# Patient Record
Sex: MALE | Race: WHITE | NOT HISPANIC OR LATINO | URBAN - METROPOLITAN AREA
[De-identification: names, ages, dates, MRNs, and addresses within clinical notes are randomized per-mention and may not be internally consistent; named-entity substitution may affect disease eponyms.]

---

## 2018-05-07 NOTE — PATIENT PROFILE ADULT. - PMH
Anxiety    Lord esophagus    COPD (chronic obstructive pulmonary disease)  mild (uses Cpap)  Depression    DM (diabetes mellitus)  2  Dyspnea    Essential hypertension    HLD (hyperlipidemia)    Hypercholesteremia    Obesity    NAGI (obstructive sleep apnea)    Sciatica

## 2018-05-07 NOTE — PATIENT PROFILE ADULT. - TEACHING/LEARNING LEARNING PREFERENCES
audio/individual instruction/verbal instruction/written material individual instruction/skill demonstration/verbal instruction/written material/audio

## 2018-05-08 ENCOUNTER — INPATIENT (INPATIENT)
Facility: HOSPITAL | Age: 73
LOS: 0 days | Discharge: HOME | DRG: 694 | End: 2018-05-09
Attending: UROLOGY | Admitting: UROLOGY
Payer: COMMERCIAL

## 2018-05-08 VITALS
OXYGEN SATURATION: 96 % | RESPIRATION RATE: 18 BRPM | HEART RATE: 101 BPM | WEIGHT: 296.74 LBS | TEMPERATURE: 98 F | DIASTOLIC BLOOD PRESSURE: 84 MMHG | HEIGHT: 66 IN | SYSTOLIC BLOOD PRESSURE: 142 MMHG

## 2018-05-08 DIAGNOSIS — N20.1 CALCULUS OF URETER: ICD-10-CM

## 2018-05-08 DIAGNOSIS — Z98.890 OTHER SPECIFIED POSTPROCEDURAL STATES: Chronic | ICD-10-CM

## 2018-05-08 LAB
GLUCOSE BLDC GLUCOMTR-MCNC: 119 MG/DL — HIGH (ref 70–99)
GLUCOSE BLDC GLUCOMTR-MCNC: 143 MG/DL — HIGH (ref 70–99)
GLUCOSE BLDC GLUCOMTR-MCNC: 145 MG/DL — HIGH (ref 70–99)
GLUCOSE BLDC GLUCOMTR-MCNC: 151 MG/DL — HIGH (ref 70–99)

## 2018-05-08 RX ORDER — HYDRALAZINE HCL 50 MG
5 TABLET ORAL ONCE
Qty: 0 | Refills: 0 | Status: COMPLETED | OUTPATIENT
Start: 2018-05-08 | End: 2018-05-08

## 2018-05-08 RX ORDER — BENZOCAINE AND MENTHOL 5; 1 G/100ML; G/100ML
1 LIQUID ORAL EVERY 6 HOURS
Qty: 0 | Refills: 0 | Status: DISCONTINUED | OUTPATIENT
Start: 2018-05-08 | End: 2018-05-09

## 2018-05-08 RX ORDER — DEXTROSE 50 % IN WATER 50 %
12.5 SYRINGE (ML) INTRAVENOUS ONCE
Qty: 0 | Refills: 0 | Status: DISCONTINUED | OUTPATIENT
Start: 2018-05-08 | End: 2018-05-09

## 2018-05-08 RX ORDER — SODIUM CHLORIDE 9 MG/ML
1000 INJECTION, SOLUTION INTRAVENOUS
Qty: 0 | Refills: 0 | Status: DISCONTINUED | OUTPATIENT
Start: 2018-05-08 | End: 2018-05-09

## 2018-05-08 RX ORDER — SENNA PLUS 8.6 MG/1
2 TABLET ORAL AT BEDTIME
Qty: 0 | Refills: 0 | Status: DISCONTINUED | OUTPATIENT
Start: 2018-05-08 | End: 2018-05-09

## 2018-05-08 RX ORDER — CEFAZOLIN SODIUM 1 G
1000 VIAL (EA) INJECTION EVERY 8 HOURS
Qty: 0 | Refills: 0 | Status: DISCONTINUED | OUTPATIENT
Start: 2018-05-08 | End: 2018-05-09

## 2018-05-08 RX ORDER — HEPARIN SODIUM 5000 [USP'U]/ML
5000 INJECTION INTRAVENOUS; SUBCUTANEOUS EVERY 8 HOURS
Qty: 0 | Refills: 0 | Status: DISCONTINUED | OUTPATIENT
Start: 2018-05-08 | End: 2018-05-09

## 2018-05-08 RX ORDER — CEFAZOLIN SODIUM 1 G
1000 VIAL (EA) INJECTION EVERY 8 HOURS
Qty: 0 | Refills: 0 | Status: DISCONTINUED | OUTPATIENT
Start: 2018-05-08 | End: 2018-05-08

## 2018-05-08 RX ORDER — DEXTROSE 50 % IN WATER 50 %
25 SYRINGE (ML) INTRAVENOUS ONCE
Qty: 0 | Refills: 0 | Status: DISCONTINUED | OUTPATIENT
Start: 2018-05-08 | End: 2018-05-09

## 2018-05-08 RX ORDER — LISINOPRIL 2.5 MG/1
20 TABLET ORAL DAILY
Qty: 0 | Refills: 0 | Status: DISCONTINUED | OUTPATIENT
Start: 2018-05-08 | End: 2018-05-09

## 2018-05-08 RX ORDER — ACETAMINOPHEN 500 MG
650 TABLET ORAL EVERY 6 HOURS
Qty: 0 | Refills: 0 | Status: DISCONTINUED | OUTPATIENT
Start: 2018-05-08 | End: 2018-05-09

## 2018-05-08 RX ORDER — OXYCODONE AND ACETAMINOPHEN 5; 325 MG/1; MG/1
1 TABLET ORAL EVERY 4 HOURS
Qty: 0 | Refills: 0 | Status: DISCONTINUED | OUTPATIENT
Start: 2018-05-08 | End: 2018-05-09

## 2018-05-08 RX ORDER — HYDROCHLOROTHIAZIDE 25 MG
12.5 TABLET ORAL DAILY
Qty: 0 | Refills: 0 | Status: DISCONTINUED | OUTPATIENT
Start: 2018-05-08 | End: 2018-05-09

## 2018-05-08 RX ORDER — GLUCAGON INJECTION, SOLUTION 0.5 MG/.1ML
1 INJECTION, SOLUTION SUBCUTANEOUS ONCE
Qty: 0 | Refills: 0 | Status: DISCONTINUED | OUTPATIENT
Start: 2018-05-08 | End: 2018-05-09

## 2018-05-08 RX ORDER — OXYCODONE AND ACETAMINOPHEN 5; 325 MG/1; MG/1
2 TABLET ORAL EVERY 6 HOURS
Qty: 0 | Refills: 0 | Status: DISCONTINUED | OUTPATIENT
Start: 2018-05-08 | End: 2018-05-09

## 2018-05-08 RX ORDER — DOCUSATE SODIUM 100 MG
100 CAPSULE ORAL
Qty: 0 | Refills: 0 | Status: DISCONTINUED | OUTPATIENT
Start: 2018-05-08 | End: 2018-05-09

## 2018-05-08 RX ORDER — ONDANSETRON 8 MG/1
4 TABLET, FILM COATED ORAL EVERY 6 HOURS
Qty: 0 | Refills: 0 | Status: DISCONTINUED | OUTPATIENT
Start: 2018-05-08 | End: 2018-05-09

## 2018-05-08 RX ORDER — FLUOXETINE HCL 10 MG
40 CAPSULE ORAL DAILY
Qty: 0 | Refills: 0 | Status: DISCONTINUED | OUTPATIENT
Start: 2018-05-08 | End: 2018-05-09

## 2018-05-08 RX ORDER — ALBUTEROL 90 UG/1
2 AEROSOL, METERED ORAL EVERY 6 HOURS
Qty: 0 | Refills: 0 | Status: DISCONTINUED | OUTPATIENT
Start: 2018-05-08 | End: 2018-05-09

## 2018-05-08 RX ORDER — INSULIN LISPRO 100/ML
VIAL (ML) SUBCUTANEOUS
Qty: 0 | Refills: 0 | Status: DISCONTINUED | OUTPATIENT
Start: 2018-05-08 | End: 2018-05-09

## 2018-05-08 RX ORDER — ASPIRIN/CALCIUM CARB/MAGNESIUM 324 MG
325 TABLET ORAL DAILY
Qty: 0 | Refills: 0 | Status: DISCONTINUED | OUTPATIENT
Start: 2018-05-08 | End: 2018-05-09

## 2018-05-08 RX ORDER — METOPROLOL TARTRATE 50 MG
5 TABLET ORAL ONCE
Qty: 0 | Refills: 0 | Status: COMPLETED | OUTPATIENT
Start: 2018-05-08 | End: 2018-05-08

## 2018-05-08 RX ORDER — INSULIN LISPRO 100/ML
VIAL (ML) SUBCUTANEOUS AT BEDTIME
Qty: 0 | Refills: 0 | Status: DISCONTINUED | OUTPATIENT
Start: 2018-05-08 | End: 2018-05-09

## 2018-05-08 RX ORDER — OXYBUTYNIN CHLORIDE 5 MG
5 TABLET ORAL THREE TIMES A DAY
Qty: 0 | Refills: 0 | Status: DISCONTINUED | OUTPATIENT
Start: 2018-05-08 | End: 2018-05-09

## 2018-05-08 RX ORDER — DEXTROSE 50 % IN WATER 50 %
15 SYRINGE (ML) INTRAVENOUS ONCE
Qty: 0 | Refills: 0 | Status: DISCONTINUED | OUTPATIENT
Start: 2018-05-08 | End: 2018-05-09

## 2018-05-08 RX ORDER — SODIUM CHLORIDE 9 MG/ML
1000 INJECTION INTRAMUSCULAR; INTRAVENOUS; SUBCUTANEOUS
Qty: 0 | Refills: 0 | Status: DISCONTINUED | OUTPATIENT
Start: 2018-05-08 | End: 2018-05-09

## 2018-05-08 RX ORDER — ATORVASTATIN CALCIUM 80 MG/1
80 TABLET, FILM COATED ORAL AT BEDTIME
Qty: 0 | Refills: 0 | Status: DISCONTINUED | OUTPATIENT
Start: 2018-05-08 | End: 2018-05-09

## 2018-05-08 RX ADMIN — HEPARIN SODIUM 5000 UNIT(S): 5000 INJECTION INTRAVENOUS; SUBCUTANEOUS at 21:37

## 2018-05-08 RX ADMIN — SODIUM CHLORIDE 75 MILLILITER(S): 9 INJECTION INTRAMUSCULAR; INTRAVENOUS; SUBCUTANEOUS at 13:13

## 2018-05-08 RX ADMIN — OXYCODONE AND ACETAMINOPHEN 1 TABLET(S): 5; 325 TABLET ORAL at 12:45

## 2018-05-08 RX ADMIN — LISINOPRIL 20 MILLIGRAM(S): 2.5 TABLET ORAL at 21:38

## 2018-05-08 RX ADMIN — Medication 5 MILLIGRAM(S): at 12:45

## 2018-05-08 RX ADMIN — Medication 5 MILLIGRAM(S): at 12:50

## 2018-05-08 RX ADMIN — ATORVASTATIN CALCIUM 80 MILLIGRAM(S): 80 TABLET, FILM COATED ORAL at 21:38

## 2018-05-08 RX ADMIN — OXYCODONE AND ACETAMINOPHEN 1 TABLET(S): 5; 325 TABLET ORAL at 15:52

## 2018-05-08 RX ADMIN — Medication 1000 MILLIGRAM(S): at 19:12

## 2018-05-08 RX ADMIN — Medication 100 MILLIGRAM(S): at 21:37

## 2018-05-08 RX ADMIN — Medication 40 MILLIGRAM(S): at 21:38

## 2018-05-08 RX ADMIN — BENZOCAINE AND MENTHOL 1 LOZENGE: 5; 1 LIQUID ORAL at 21:15

## 2018-05-08 RX ADMIN — Medication 12.5 MILLIGRAM(S): at 13:33

## 2018-05-08 RX ADMIN — Medication 5 MILLIGRAM(S): at 13:20

## 2018-05-08 NOTE — H&P ADULT - HISTORY OF PRESENT ILLNESS
74 yo m with hx ureteral stone presents for elective cysto, r urs laser litho stent. no new complaints since last visit to urology office, preop clearance in chart.

## 2018-05-08 NOTE — BRIEF OPERATIVE NOTE - POST-OP DX
Right ureteral stone  05/08/2018  Right ureteral stricture  Active  Juan Lopez  Urethral stricture  05/08/2018    Active  Juan Lopez

## 2018-05-08 NOTE — PROGRESS NOTE ADULT - SUBJECTIVE AND OBJECTIVE BOX
POST OP NOTE:    Patient states has no complaints at this time,  denies any nausea or vomiting, denies fever or chills, denies SOB or CP.       05-08-18 @ 07:01  -  05-08-18 @ 16:58  --------------------------------------------------------  IN: 225 mL / OUT: 50 mL / NET: 175 mL      T(C): 36.9 (05-08-18 @ 11:40), Max: 36.9 (05-08-18 @ 11:40)  HR: 70 (05-08-18 @ 16:11) (70 - 101)  BP: 135/63 (05-08-18 @ 16:11) (135/63 - 206/99)  RR: 14 (05-08-18 @ 16:11) (6 - 30)  SpO2: 96% (05-08-18 @ 16:11) (92% - 99%)    GEN: NAD  ABD: Soft, ND, NT  : ann draining clear urine      A/P 72 yo m with right ureteral stone s/p cysto R stent  1) advance to regular diet  2) cont IVF until tolerating PO  3) cont abx  4)keep ann  5) oob AND is

## 2018-05-08 NOTE — BRIEF OPERATIVE NOTE - PROCEDURE
<<-----Click on this checkbox to enter Procedure Cystourethroscopy with ureteroscopy  05/08/2018  Right, ureteral stent, urethral dilation  Active  [email or web address]

## 2018-05-08 NOTE — PROGRESS NOTE ADULT - SUBJECTIVE AND OBJECTIVE BOX
; Preop/Admit Note  Mr. Sandoval is admitted for a right ureteroscopy, laser lithotripsy and J-J stent placement as management of a 1cm right proximal ureteral stone.  The procedure, including the risks (bleeding, infection, inability to access/break/remove stone,  urinary retention,  urinary tract/bowel/vascular injuries, need for subsequent intervention), benefits and alternatives were thoroughly discussed with the patient.    He will stay overnight due to his obesity and sleep apnea (as advised by anesthesia).  CECILE Leon

## 2018-05-09 ENCOUNTER — TRANSCRIPTION ENCOUNTER (OUTPATIENT)
Age: 73
End: 2018-05-09

## 2018-05-09 VITALS — TEMPERATURE: 99 F

## 2018-05-09 LAB
ANION GAP SERPL CALC-SCNC: 13 MMOL/L — SIGNIFICANT CHANGE UP (ref 5–17)
BUN SERPL-MCNC: 22 MG/DL — SIGNIFICANT CHANGE UP (ref 7–23)
CALCIUM SERPL-MCNC: 9 MG/DL — SIGNIFICANT CHANGE UP (ref 8.4–10.5)
CHLORIDE SERPL-SCNC: 96 MMOL/L — SIGNIFICANT CHANGE UP (ref 96–108)
CO2 SERPL-SCNC: 26 MMOL/L — SIGNIFICANT CHANGE UP (ref 22–31)
CREAT SERPL-MCNC: 1.22 MG/DL — SIGNIFICANT CHANGE UP (ref 0.5–1.3)
GLUCOSE BLDC GLUCOMTR-MCNC: 123 MG/DL — HIGH (ref 70–99)
GLUCOSE BLDC GLUCOMTR-MCNC: 211 MG/DL — HIGH (ref 70–99)
GLUCOSE SERPL-MCNC: 127 MG/DL — HIGH (ref 70–99)
HCT VFR BLD CALC: 40.4 % — SIGNIFICANT CHANGE UP (ref 39–50)
HGB BLD-MCNC: 13.3 G/DL — SIGNIFICANT CHANGE UP (ref 13–17)
MAGNESIUM SERPL-MCNC: 2 MG/DL — SIGNIFICANT CHANGE UP (ref 1.6–2.6)
MCHC RBC-ENTMCNC: 30.5 PG — SIGNIFICANT CHANGE UP (ref 27–34)
MCHC RBC-ENTMCNC: 32.9 G/DL — SIGNIFICANT CHANGE UP (ref 32–36)
MCV RBC AUTO: 92.7 FL — SIGNIFICANT CHANGE UP (ref 80–100)
PHOSPHATE SERPL-MCNC: 4 MG/DL — SIGNIFICANT CHANGE UP (ref 2.5–4.5)
PLATELET # BLD AUTO: 239 K/UL — SIGNIFICANT CHANGE UP (ref 150–400)
POTASSIUM SERPL-MCNC: 4.1 MMOL/L — SIGNIFICANT CHANGE UP (ref 3.5–5.3)
POTASSIUM SERPL-SCNC: 4.1 MMOL/L — SIGNIFICANT CHANGE UP (ref 3.5–5.3)
RBC # BLD: 4.36 M/UL — SIGNIFICANT CHANGE UP (ref 4.2–5.8)
RBC # FLD: 14.6 % — SIGNIFICANT CHANGE UP (ref 10.3–16.9)
SODIUM SERPL-SCNC: 135 MMOL/L — SIGNIFICANT CHANGE UP (ref 135–145)
WBC # BLD: 9.5 K/UL — SIGNIFICANT CHANGE UP (ref 3.8–10.5)
WBC # FLD AUTO: 9.5 K/UL — SIGNIFICANT CHANGE UP (ref 3.8–10.5)

## 2018-05-09 RX ORDER — PHENAZOPYRIDINE HCL 100 MG
1 TABLET ORAL
Qty: 21 | Refills: 0 | OUTPATIENT
Start: 2018-05-09 | End: 2018-05-15

## 2018-05-09 RX ORDER — DOCUSATE SODIUM 100 MG
1 CAPSULE ORAL
Qty: 28 | Refills: 0 | OUTPATIENT
Start: 2018-05-09 | End: 2018-05-22

## 2018-05-09 RX ORDER — CEPHALEXIN 500 MG
1 CAPSULE ORAL
Qty: 9 | Refills: 0 | OUTPATIENT
Start: 2018-05-09 | End: 2018-05-11

## 2018-05-09 RX ADMIN — HEPARIN SODIUM 5000 UNIT(S): 5000 INJECTION INTRAVENOUS; SUBCUTANEOUS at 06:26

## 2018-05-09 RX ADMIN — OXYCODONE AND ACETAMINOPHEN 2 TABLET(S): 5; 325 TABLET ORAL at 02:05

## 2018-05-09 RX ADMIN — Medication 1000 MILLIGRAM(S): at 03:01

## 2018-05-09 RX ADMIN — OXYCODONE AND ACETAMINOPHEN 2 TABLET(S): 5; 325 TABLET ORAL at 02:48

## 2018-05-09 RX ADMIN — Medication 40 MILLIGRAM(S): at 11:20

## 2018-05-09 RX ADMIN — BENZOCAINE AND MENTHOL 1 LOZENGE: 5; 1 LIQUID ORAL at 14:54

## 2018-05-09 RX ADMIN — Medication 325 MILLIGRAM(S): at 11:20

## 2018-05-09 RX ADMIN — Medication 100 MILLIGRAM(S): at 06:26

## 2018-05-09 NOTE — DISCHARGE NOTE ADULT - PLAN OF CARE
Regular daily functions. Ambulate. Hydrate. Underwent cystoscopy, stent placement 5/8/18  Discharge home, follow up with Dr. Leon.   Keflex for 3 days . Percocet for managing moderate pain

## 2018-05-09 NOTE — PROGRESS NOTE ADULT - SUBJECTIVE AND OBJECTIVE BOX
INTERVAL HPI/OVERNIGHT EVENTS:  No acute events overnight.    VITALS:    T(F): 97 (05-09-18 @ 04:29), Max: 99.2 (05-08-18 @ 18:42)  HR: 64 (05-09-18 @ 05:00) (64 - 101)  BP: 158/74 (05-09-18 @ 05:00) (129/68 - 206/99)  RR: 15 (05-09-18 @ 05:00) (6 - 30)  SpO2: 95% (05-09-18 @ 05:00) (92% - 99%)  Wt(kg): --    I&O's Detail    08 May 2018 07:01  -  09 May 2018 06:18  --------------------------------------------------------  IN:    sodium chloride 0.9%.: 225 mL  Total IN: 225 mL    OUT:    Indwelling Catheter - Urethral: 1630 mL  Total OUT: 1630 mL    Total NET: -1405 mL          MEDICATIONS:    ANTIBIOTICS:  ceFAZolin  Injectable. 1000 milliGRAM(s) IV Push every 8 hours      PAIN CONTROL:  acetaminophen   Tablet 650 milliGRAM(s) Oral every 6 hours PRN  acetaminophen   Tablet. 650 milliGRAM(s) Oral every 6 hours PRN  FLUoxetine 40 milliGRAM(s) Oral daily  ondansetron Injectable 4 milliGRAM(s) IV Push every 6 hours PRN  oxyCODONE    5 mG/acetaminophen 325 mG 1 Tablet(s) Oral every 4 hours PRN  oxyCODONE    5 mG/acetaminophen 325 mG 2 Tablet(s) Oral every 6 hours PRN       MEDS:  oxybutynin 5 milliGRAM(s) Oral three times a day PRN      HEME/ONC  aspirin enteric coated 325 milliGRAM(s) Oral daily  heparin  Injectable 5000 Unit(s) SubCutaneous every 8 hours        PHYSICAL EXAM:  General: No acute distress.  Alert and Oriented  Abdominal Exam: soft, NT, ND   Exam: FC intact, urine clear      LABS:                RADIOLOGY & ADDITIONAL TESTS:

## 2018-05-09 NOTE — DISCHARGE NOTE ADULT - CARE PLAN
Principal Discharge DX:	Ureteral stone  Goal:	Regular daily functions. Ambulate. Hydrate.  Assessment and plan of treatment:	Underwent cystoscopy, stent placement 5/8/18  Discharge home, follow up with Dr. Leon.   Keflex for 3 days . Percocet for managing moderate pain

## 2018-05-09 NOTE — DISCHARGE NOTE ADULT - MEDICATION SUMMARY - MEDICATIONS TO TAKE
I will START or STAY ON the medications listed below when I get home from the hospital:    Aspirin Enteric Coated 325 mg oral delayed release tablet  -- 1 tab(s) by mouth once a day  -- Indication: For Home medication    Percocet 5/325 oral tablet  -- 1 tab(s) by mouth every 8 hours, As Needed -for moderate pain MDD:4   -- Caution federal law prohibits the transfer of this drug to any person other  than the person for whom it was prescribed.  May cause drowsiness.  Alcohol may intensify this effect.  Use care when operating dangerous machinery.  This prescription cannot be refilled.  This product contains acetaminophen.  Do not use  with any other product containing acetaminophen to prevent possible liver damage.  Using more of this medication than prescribed may cause serious breathing problems.    -- Indication: For Pain management    fosinopril 20 mg oral tablet  -- 1 tab(s) by mouth once a day  -- Indication: For Home medication    FLUoxetine 40 mg oral capsule  -- 1 cap(s) by mouth once a day  -- Indication: For Home medication    atorvastatin 80 mg oral tablet  -- 1 tab(s) by mouth once a day  -- Indication: For Home medication    lisinopril-hydroCHLOROthiazide 20 mg-12.5 mg oral tablet  -- 1 tab(s) by mouth once a day  -- Indication: For Home medication    Ventolin HFA 90 mcg/inh inhalation aerosol  -- 2 puff(s) inhaled 4 times a day, As Needed  -- Indication: For Home medication    Keflex 500 mg oral capsule  -- 1 cap(s) by mouth 3 times a day   -- Finish all this medication unless otherwise directed by prescriber.    -- Indication: For Antibiotic    docusate sodium 100 mg oral capsule  -- 1 cap(s) by mouth 2 times a day, As Needed -for constipation   -- Indication: For COnstipation    Pyridium 100 mg oral tablet  -- 1 tab(s) by mouth 3 times a day, As Needed Dysuria  -- May discolor urine or feces.  Medication should be taken with plenty of water.  Take with food or milk.    -- Indication: For Dysuria- burning during urination    Vitamin D3 2000 intl units oral tablet  -- 1 tab(s) by mouth once a day  -- Indication: For Home medication    Vitamin B12 1000 mcg oral tablet  -- 1 tab(s) by mouth once a day  -- Indication: For Home medication

## 2018-05-09 NOTE — DISCHARGE NOTE ADULT - ADDITIONAL INSTRUCTIONS
regular/diabetic diet, activity as tolerated. No heavy lifting or straining. Stay well hydrated. If fever >100.4 or any change or worsening of symptoms please call doctor or report to ED. Make follow up appointment with Dr Leon

## 2018-05-09 NOTE — DISCHARGE NOTE ADULT - HOSPITAL COURSE
73M history anxiety, depression, copd, diabetes, htn, hld sciatica underwent cysto, stent 5/8 for ureteral stone . Tolerated procedure well. VSS, afebrile and hemodynamically stable.

## 2018-05-11 DIAGNOSIS — J44.9 CHRONIC OBSTRUCTIVE PULMONARY DISEASE, UNSPECIFIED: ICD-10-CM

## 2018-05-11 DIAGNOSIS — F41.9 ANXIETY DISORDER, UNSPECIFIED: ICD-10-CM

## 2018-05-11 DIAGNOSIS — F32.9 MAJOR DEPRESSIVE DISORDER, SINGLE EPISODE, UNSPECIFIED: ICD-10-CM

## 2018-05-11 DIAGNOSIS — E66.9 OBESITY, UNSPECIFIED: ICD-10-CM

## 2018-05-11 DIAGNOSIS — G47.33 OBSTRUCTIVE SLEEP APNEA (ADULT) (PEDIATRIC): ICD-10-CM

## 2018-05-11 DIAGNOSIS — N13.5 CROSSING VESSEL AND STRICTURE OF URETER WITHOUT HYDRONEPHROSIS: ICD-10-CM

## 2018-05-11 DIAGNOSIS — Z88.2 ALLERGY STATUS TO SULFONAMIDES: ICD-10-CM

## 2018-05-11 DIAGNOSIS — N35.9 URETHRAL STRICTURE, UNSPECIFIED: ICD-10-CM

## 2018-05-11 DIAGNOSIS — N20.1 CALCULUS OF URETER: ICD-10-CM

## 2018-05-11 DIAGNOSIS — I10 ESSENTIAL (PRIMARY) HYPERTENSION: ICD-10-CM

## 2018-05-11 DIAGNOSIS — M54.30 SCIATICA, UNSPECIFIED SIDE: ICD-10-CM

## 2018-05-11 DIAGNOSIS — E11.9 TYPE 2 DIABETES MELLITUS WITHOUT COMPLICATIONS: ICD-10-CM

## 2018-05-23 PROCEDURE — 36415 COLL VENOUS BLD VENIPUNCTURE: CPT

## 2018-05-23 PROCEDURE — 82962 GLUCOSE BLOOD TEST: CPT

## 2018-05-23 PROCEDURE — 83735 ASSAY OF MAGNESIUM: CPT

## 2018-05-23 PROCEDURE — C2617: CPT

## 2018-05-23 PROCEDURE — 84100 ASSAY OF PHOSPHORUS: CPT

## 2018-05-23 PROCEDURE — 76000 FLUOROSCOPY <1 HR PHYS/QHP: CPT

## 2018-05-23 PROCEDURE — 80048 BASIC METABOLIC PNL TOTAL CA: CPT

## 2018-05-23 PROCEDURE — 52332 CYSTOSCOPY AND TREATMENT: CPT

## 2018-05-23 PROCEDURE — 85027 COMPLETE CBC AUTOMATED: CPT

## 2018-05-29 PROBLEM — E11.9 TYPE 2 DIABETES MELLITUS WITHOUT COMPLICATIONS: Chronic | Status: ACTIVE | Noted: 2018-05-07

## 2018-05-29 PROBLEM — E78.5 HYPERLIPIDEMIA, UNSPECIFIED: Chronic | Status: ACTIVE | Noted: 2018-05-07

## 2018-05-29 PROBLEM — E66.9 OBESITY, UNSPECIFIED: Chronic | Status: ACTIVE | Noted: 2018-05-07

## 2018-05-29 PROBLEM — I10 ESSENTIAL (PRIMARY) HYPERTENSION: Chronic | Status: ACTIVE | Noted: 2018-05-07

## 2018-05-29 PROBLEM — R06.00 DYSPNEA, UNSPECIFIED: Chronic | Status: ACTIVE | Noted: 2018-05-07

## 2018-05-29 PROBLEM — J44.9 CHRONIC OBSTRUCTIVE PULMONARY DISEASE, UNSPECIFIED: Chronic | Status: ACTIVE | Noted: 2018-05-07

## 2018-05-29 PROBLEM — F32.9 MAJOR DEPRESSIVE DISORDER, SINGLE EPISODE, UNSPECIFIED: Chronic | Status: ACTIVE | Noted: 2018-05-07

## 2018-05-29 PROBLEM — K22.70 BARRETT'S ESOPHAGUS WITHOUT DYSPLASIA: Chronic | Status: ACTIVE | Noted: 2018-05-07

## 2018-05-29 PROBLEM — F41.9 ANXIETY DISORDER, UNSPECIFIED: Chronic | Status: ACTIVE | Noted: 2018-05-07

## 2018-05-29 PROBLEM — M54.30 SCIATICA, UNSPECIFIED SIDE: Chronic | Status: ACTIVE | Noted: 2018-05-07

## 2018-05-29 PROBLEM — G47.33 OBSTRUCTIVE SLEEP APNEA (ADULT) (PEDIATRIC): Chronic | Status: ACTIVE | Noted: 2018-05-07

## 2018-05-29 PROBLEM — E78.00 PURE HYPERCHOLESTEROLEMIA, UNSPECIFIED: Chronic | Status: ACTIVE | Noted: 2018-05-07

## 2018-06-04 VITALS
WEIGHT: 304.02 LBS | HEIGHT: 65 IN | DIASTOLIC BLOOD PRESSURE: 73 MMHG | TEMPERATURE: 98 F | SYSTOLIC BLOOD PRESSURE: 160 MMHG | OXYGEN SATURATION: 96 % | RESPIRATION RATE: 18 BRPM | HEART RATE: 83 BPM

## 2018-06-04 NOTE — PATIENT PROFILE ADULT. - PMH
Anxiety    Lord esophagus    COPD (chronic obstructive pulmonary disease)  mild (uses Cpap)  Depression    DM (diabetes mellitus)  2  Dyspnea    Essential hypertension    HLD (hyperlipidemia)    Hypercholesteremia    Obesity    NAGI (obstructive sleep apnea)    Sciatica    Ureteral stone  right

## 2018-06-05 ENCOUNTER — OUTPATIENT (OUTPATIENT)
Dept: INPATIENT UNIT | Facility: HOSPITAL | Age: 73
LOS: 1 days | Discharge: HOME | End: 2018-06-05
Payer: MEDICARE

## 2018-06-05 DIAGNOSIS — Z96.0 PRESENCE OF UROGENITAL IMPLANTS: Chronic | ICD-10-CM

## 2018-06-05 DIAGNOSIS — Z98.890 OTHER SPECIFIED POSTPROCEDURAL STATES: Chronic | ICD-10-CM

## 2018-06-05 DIAGNOSIS — N20.1 CALCULUS OF URETER: ICD-10-CM

## 2018-06-05 LAB
GLUCOSE BLDC GLUCOMTR-MCNC: 120 MG/DL — HIGH (ref 70–99)
GLUCOSE BLDC GLUCOMTR-MCNC: 175 MG/DL — HIGH (ref 70–99)

## 2018-06-05 RX ORDER — ASPIRIN/CALCIUM CARB/MAGNESIUM 324 MG
1 TABLET ORAL
Qty: 0 | Refills: 0 | COMMUNITY

## 2018-06-05 RX ORDER — ATORVASTATIN CALCIUM 80 MG/1
1 TABLET, FILM COATED ORAL
Qty: 0 | Refills: 0 | COMMUNITY

## 2018-06-05 RX ORDER — METOCLOPRAMIDE HCL 10 MG
10 TABLET ORAL
Qty: 0 | Refills: 0 | Status: DISCONTINUED | OUTPATIENT
Start: 2018-06-05 | End: 2018-06-06

## 2018-06-05 RX ORDER — MULTIVIT-MIN/FERROUS GLUCONATE 9 MG/15 ML
1 LIQUID (ML) ORAL
Qty: 0 | Refills: 0 | COMMUNITY

## 2018-06-05 RX ORDER — HYDROCHLOROTHIAZIDE 25 MG
12.5 TABLET ORAL DAILY
Qty: 0 | Refills: 0 | Status: DISCONTINUED | OUTPATIENT
Start: 2018-06-05 | End: 2018-06-06

## 2018-06-05 RX ORDER — DOCUSATE SODIUM 100 MG
100 CAPSULE ORAL
Qty: 0 | Refills: 0 | Status: DISCONTINUED | OUTPATIENT
Start: 2018-06-05 | End: 2018-06-06

## 2018-06-05 RX ORDER — ACETAMINOPHEN 500 MG
650 TABLET ORAL EVERY 6 HOURS
Qty: 0 | Refills: 0 | Status: DISCONTINUED | OUTPATIENT
Start: 2018-06-05 | End: 2018-06-06

## 2018-06-05 RX ORDER — CHROMIUM PICOLINATE 200 MCG
1 TABLET ORAL
Qty: 0 | Refills: 0 | COMMUNITY

## 2018-06-05 RX ORDER — ALBUTEROL 90 UG/1
2 AEROSOL, METERED ORAL EVERY 6 HOURS
Qty: 0 | Refills: 0 | Status: DISCONTINUED | OUTPATIENT
Start: 2018-06-05 | End: 2018-06-06

## 2018-06-05 RX ORDER — LISINOPRIL 2.5 MG/1
20 TABLET ORAL DAILY
Qty: 0 | Refills: 0 | Status: DISCONTINUED | OUTPATIENT
Start: 2018-06-05 | End: 2018-06-06

## 2018-06-05 RX ORDER — FLUOXETINE HCL 10 MG
40 CAPSULE ORAL DAILY
Qty: 0 | Refills: 0 | Status: DISCONTINUED | OUTPATIENT
Start: 2018-06-05 | End: 2018-06-06

## 2018-06-05 RX ORDER — ALBUTEROL 90 UG/1
2 AEROSOL, METERED ORAL
Qty: 0 | Refills: 0 | COMMUNITY

## 2018-06-05 RX ORDER — SODIUM CHLORIDE 9 MG/ML
1000 INJECTION, SOLUTION INTRAVENOUS
Qty: 0 | Refills: 0 | Status: COMPLETED | OUTPATIENT
Start: 2018-06-05 | End: 2018-06-05

## 2018-06-05 RX ORDER — PREGABALIN 225 MG/1
1 CAPSULE ORAL
Qty: 0 | Refills: 0 | COMMUNITY

## 2018-06-05 RX ORDER — HYDROMORPHONE HYDROCHLORIDE 2 MG/ML
0.5 INJECTION INTRAMUSCULAR; INTRAVENOUS; SUBCUTANEOUS
Qty: 0 | Refills: 0 | Status: DISCONTINUED | OUTPATIENT
Start: 2018-06-05 | End: 2018-06-06

## 2018-06-05 RX ORDER — CEFAZOLIN SODIUM 1 G
2000 VIAL (EA) INJECTION EVERY 8 HOURS
Qty: 0 | Refills: 0 | Status: DISCONTINUED | OUTPATIENT
Start: 2018-06-05 | End: 2018-06-06

## 2018-06-05 RX ORDER — ATORVASTATIN CALCIUM 80 MG/1
80 TABLET, FILM COATED ORAL AT BEDTIME
Qty: 0 | Refills: 0 | Status: DISCONTINUED | OUTPATIENT
Start: 2018-06-05 | End: 2018-06-06

## 2018-06-05 RX ORDER — FLUOXETINE HCL 10 MG
1 CAPSULE ORAL
Qty: 0 | Refills: 0 | COMMUNITY

## 2018-06-05 RX ORDER — OXYCODONE AND ACETAMINOPHEN 5; 325 MG/1; MG/1
2 TABLET ORAL EVERY 6 HOURS
Qty: 0 | Refills: 0 | Status: DISCONTINUED | OUTPATIENT
Start: 2018-06-05 | End: 2018-06-06

## 2018-06-05 RX ORDER — BENZOCAINE AND MENTHOL 5; 1 G/100ML; G/100ML
1 LIQUID ORAL EVERY 6 HOURS
Qty: 0 | Refills: 0 | Status: DISCONTINUED | OUTPATIENT
Start: 2018-06-05 | End: 2018-06-06

## 2018-06-05 RX ORDER — OXYCODONE AND ACETAMINOPHEN 5; 325 MG/1; MG/1
1 TABLET ORAL EVERY 4 HOURS
Qty: 0 | Refills: 0 | Status: DISCONTINUED | OUTPATIENT
Start: 2018-06-05 | End: 2018-06-06

## 2018-06-05 RX ORDER — ONDANSETRON 8 MG/1
4 TABLET, FILM COATED ORAL EVERY 6 HOURS
Qty: 0 | Refills: 0 | Status: DISCONTINUED | OUTPATIENT
Start: 2018-06-05 | End: 2018-06-06

## 2018-06-05 RX ORDER — FOSINOPRIL SODIUM 10 MG/1
1 TABLET ORAL
Qty: 0 | Refills: 0 | COMMUNITY

## 2018-06-05 RX ORDER — CHOLECALCIFEROL (VITAMIN D3) 125 MCG
1 CAPSULE ORAL
Qty: 0 | Refills: 0 | COMMUNITY

## 2018-06-05 RX ORDER — HEPARIN SODIUM 5000 [USP'U]/ML
7500 INJECTION INTRAVENOUS; SUBCUTANEOUS EVERY 8 HOURS
Qty: 0 | Refills: 0 | Status: DISCONTINUED | OUTPATIENT
Start: 2018-06-05 | End: 2018-06-06

## 2018-06-05 RX ADMIN — OXYCODONE AND ACETAMINOPHEN 1 TABLET(S): 5; 325 TABLET ORAL at 21:12

## 2018-06-05 RX ADMIN — BENZOCAINE AND MENTHOL 1 LOZENGE: 5; 1 LIQUID ORAL at 16:10

## 2018-06-05 RX ADMIN — Medication 100 MILLIGRAM(S): at 22:00

## 2018-06-05 RX ADMIN — OXYCODONE AND ACETAMINOPHEN 1 TABLET(S): 5; 325 TABLET ORAL at 16:15

## 2018-06-05 RX ADMIN — HEPARIN SODIUM 7500 UNIT(S): 5000 INJECTION INTRAVENOUS; SUBCUTANEOUS at 15:30

## 2018-06-05 RX ADMIN — Medication 20 MILLIGRAM(S): at 22:05

## 2018-06-05 RX ADMIN — Medication 100 MILLIGRAM(S): at 14:00

## 2018-06-05 RX ADMIN — OXYCODONE AND ACETAMINOPHEN 2 TABLET(S): 5; 325 TABLET ORAL at 10:42

## 2018-06-05 RX ADMIN — ATORVASTATIN CALCIUM 80 MILLIGRAM(S): 80 TABLET, FILM COATED ORAL at 21:50

## 2018-06-05 RX ADMIN — OXYCODONE AND ACETAMINOPHEN 2 TABLET(S): 5; 325 TABLET ORAL at 12:15

## 2018-06-05 NOTE — BRIEF OPERATIVE NOTE - PROCEDURE
<<-----Click on this checkbox to enter Procedure Cystourethroscopy  06/05/2018  Right ureteroscopy, Laser lithotripsy, stone extraction, right ureteral stent exchange  Active  [email or web address]

## 2018-06-05 NOTE — PROGRESS NOTE ADULT - SUBJECTIVE AND OBJECTIVE BOX
POST OP NOTE:    Patient states has some discomfort in the penis, denies any nausea or vomiting, denies fever or chills, denies SOB or CP.       T(C): --  HR: 78 (06-05-18 @ 11:21) (64 - 78)  BP: 100/54 (06-05-18 @ 11:21) (100/54 - 129/58)  RR: 14 (06-05-18 @ 11:21) (14 - 40)  SpO2: 98% (06-05-18 @ 11:21) (96% - 99%)    GEN: NAD  ABD: Soft, obese, ND, NT  : Due to void        A/P 72 yo m with nephrolithiasis s/p cysto, urs, laser litho stent  1)follow up uo , f/u PVR  2) cont IVF until tolerating PO  3)advance diet  4) OOB

## 2018-06-05 NOTE — PROGRESS NOTE ADULT - SUBJECTIVE AND OBJECTIVE BOX
: Admit/Preop Note:   Mr. Sandoval is admitted for management of a 1.2cm right ureteral stone with ureteroscopy, laser lithotripsy and stent placement.  The procedure with the risks (including bleeding, infection, inability to access/break the stone, urinary tract/bowel/vascular injuries, need for subsequent intervention), benefits and alternatives were thoroughly discussed with the patient.  He understands and consents to surgery.  CECILE Leon

## 2018-06-06 ENCOUNTER — TRANSCRIPTION ENCOUNTER (OUTPATIENT)
Age: 73
End: 2018-06-06

## 2018-06-06 VITALS
OXYGEN SATURATION: 96 % | RESPIRATION RATE: 16 BRPM | DIASTOLIC BLOOD PRESSURE: 56 MMHG | HEART RATE: 74 BPM | SYSTOLIC BLOOD PRESSURE: 110 MMHG

## 2018-06-06 LAB
ANION GAP SERPL CALC-SCNC: 12 MMOL/L — SIGNIFICANT CHANGE UP (ref 5–17)
BASOPHILS NFR BLD AUTO: 0.1 % — SIGNIFICANT CHANGE UP (ref 0–2)
BUN SERPL-MCNC: 22 MG/DL — SIGNIFICANT CHANGE UP (ref 7–23)
CALCIUM SERPL-MCNC: 9.1 MG/DL — SIGNIFICANT CHANGE UP (ref 8.4–10.5)
CHLORIDE SERPL-SCNC: 99 MMOL/L — SIGNIFICANT CHANGE UP (ref 96–108)
CO2 SERPL-SCNC: 25 MMOL/L — SIGNIFICANT CHANGE UP (ref 22–31)
CREAT SERPL-MCNC: 1.17 MG/DL — SIGNIFICANT CHANGE UP (ref 0.5–1.3)
EOSINOPHIL NFR BLD AUTO: 1.6 % — SIGNIFICANT CHANGE UP (ref 0–6)
GLUCOSE SERPL-MCNC: 121 MG/DL — HIGH (ref 70–99)
HCT VFR BLD CALC: 36.8 % — LOW (ref 39–50)
HGB BLD-MCNC: 12.1 G/DL — LOW (ref 13–17)
LYMPHOCYTES # BLD AUTO: 20 % — SIGNIFICANT CHANGE UP (ref 13–44)
MAGNESIUM SERPL-MCNC: 1.9 MG/DL — SIGNIFICANT CHANGE UP (ref 1.6–2.6)
MCHC RBC-ENTMCNC: 29.6 PG — SIGNIFICANT CHANGE UP (ref 27–34)
MCHC RBC-ENTMCNC: 32.9 G/DL — SIGNIFICANT CHANGE UP (ref 32–36)
MCV RBC AUTO: 90 FL — SIGNIFICANT CHANGE UP (ref 80–100)
MONOCYTES NFR BLD AUTO: 5.7 % — SIGNIFICANT CHANGE UP (ref 2–14)
NEUTROPHILS NFR BLD AUTO: 72.6 % — SIGNIFICANT CHANGE UP (ref 43–77)
PHOSPHATE SERPL-MCNC: 4.4 MG/DL — SIGNIFICANT CHANGE UP (ref 2.5–4.5)
PLATELET # BLD AUTO: 232 K/UL — SIGNIFICANT CHANGE UP (ref 150–400)
POTASSIUM SERPL-MCNC: 4 MMOL/L — SIGNIFICANT CHANGE UP (ref 3.5–5.3)
POTASSIUM SERPL-SCNC: 4 MMOL/L — SIGNIFICANT CHANGE UP (ref 3.5–5.3)
RBC # BLD: 4.09 M/UL — LOW (ref 4.2–5.8)
RBC # FLD: 14.3 % — SIGNIFICANT CHANGE UP (ref 10.3–16.9)
SODIUM SERPL-SCNC: 136 MMOL/L — SIGNIFICANT CHANGE UP (ref 135–145)
WBC # BLD: 9.4 K/UL — SIGNIFICANT CHANGE UP (ref 3.8–10.5)
WBC # FLD AUTO: 9.4 K/UL — SIGNIFICANT CHANGE UP (ref 3.8–10.5)

## 2018-06-06 PROCEDURE — 85025 COMPLETE CBC W/AUTO DIFF WBC: CPT

## 2018-06-06 PROCEDURE — 36415 COLL VENOUS BLD VENIPUNCTURE: CPT

## 2018-06-06 PROCEDURE — C1758: CPT

## 2018-06-06 PROCEDURE — C1769: CPT

## 2018-06-06 PROCEDURE — 94660 CPAP INITIATION&MGMT: CPT

## 2018-06-06 PROCEDURE — C2617: CPT

## 2018-06-06 PROCEDURE — C1713: CPT

## 2018-06-06 PROCEDURE — C1889: CPT

## 2018-06-06 PROCEDURE — 83735 ASSAY OF MAGNESIUM: CPT

## 2018-06-06 PROCEDURE — 82962 GLUCOSE BLOOD TEST: CPT

## 2018-06-06 PROCEDURE — 84100 ASSAY OF PHOSPHORUS: CPT

## 2018-06-06 PROCEDURE — 82365 CALCULUS SPECTROSCOPY: CPT

## 2018-06-06 PROCEDURE — 76000 FLUOROSCOPY <1 HR PHYS/QHP: CPT

## 2018-06-06 PROCEDURE — 80048 BASIC METABOLIC PNL TOTAL CA: CPT

## 2018-06-06 PROCEDURE — 52356 CYSTO/URETERO W/LITHOTRIPSY: CPT | Mod: RT

## 2018-06-06 RX ORDER — OXYBUTYNIN CHLORIDE 5 MG
5 TABLET ORAL ONCE
Qty: 0 | Refills: 0 | Status: COMPLETED | OUTPATIENT
Start: 2018-06-06 | End: 2018-06-06

## 2018-06-06 RX ORDER — DOCUSATE SODIUM 100 MG
1 CAPSULE ORAL
Qty: 14 | Refills: 0 | OUTPATIENT
Start: 2018-06-06 | End: 2018-06-12

## 2018-06-06 RX ORDER — OXYBUTYNIN CHLORIDE 5 MG
1 TABLET ORAL
Qty: 15 | Refills: 0 | OUTPATIENT
Start: 2018-06-06 | End: 2018-06-10

## 2018-06-06 RX ADMIN — OXYCODONE AND ACETAMINOPHEN 1 TABLET(S): 5; 325 TABLET ORAL at 01:29

## 2018-06-06 RX ADMIN — BENZOCAINE AND MENTHOL 1 LOZENGE: 5; 1 LIQUID ORAL at 05:06

## 2018-06-06 RX ADMIN — HEPARIN SODIUM 7500 UNIT(S): 5000 INJECTION INTRAVENOUS; SUBCUTANEOUS at 00:28

## 2018-06-06 RX ADMIN — LISINOPRIL 20 MILLIGRAM(S): 2.5 TABLET ORAL at 05:02

## 2018-06-06 RX ADMIN — OXYCODONE AND ACETAMINOPHEN 1 TABLET(S): 5; 325 TABLET ORAL at 02:37

## 2018-06-06 RX ADMIN — Medication 12.5 MILLIGRAM(S): at 05:02

## 2018-06-06 RX ADMIN — Medication 5 MILLIGRAM(S): at 10:19

## 2018-06-06 RX ADMIN — Medication 100 MILLIGRAM(S): at 05:02

## 2018-06-06 RX ADMIN — Medication 100 MILLIGRAM(S): at 05:10

## 2018-06-06 RX ADMIN — SODIUM CHLORIDE 125 MILLILITER(S): 9 INJECTION, SOLUTION INTRAVENOUS at 03:08

## 2018-06-06 RX ADMIN — HEPARIN SODIUM 7500 UNIT(S): 5000 INJECTION INTRAVENOUS; SUBCUTANEOUS at 08:10

## 2018-06-06 NOTE — DISCHARGE NOTE ADULT - PLAN OF CARE
improvement after surgery regular diet, activity as tolerated. No heavy lifting or straining. [place] to leg bag, Stay well hydrated. If fever >100.4 or any change or worsening of symptoms please call doctor or report to ED. Make follow up appointment with Dr Leon for stent removal

## 2018-06-06 NOTE — DISCHARGE NOTE ADULT - PATIENT PORTAL LINK FT
You can access the [facility] Patient Portal, offered by [facility], by registering with the following website: [email or web address]

## 2018-06-06 NOTE — DISCHARGE NOTE ADULT - MEDICATION SUMMARY - MEDICATIONS TO TAKE
I will START or STAY ON the medications listed below when I get home from the hospital:    Aspirin Enteric Coated 325 mg oral delayed release tablet  -- 1 tab(s) by mouth once a day  -- Indication: For home med    Percocet 5/325 oral tablet  -- 1 tab(s) by mouth every 6 hours, As Needed -for severe pain MDD:4   -- Caution federal law prohibits the transfer of this drug to any person other  than the person for whom it was prescribed.  May cause drowsiness.  Alcohol may intensify this effect.  Use care when operating dangerous machinery.  This prescription cannot be refilled.  This product contains acetaminophen.  Do not use  with any other product containing acetaminophen to prevent possible liver damage.  Using more of this medication than prescribed may cause serious breathing problems.    -- Indication: For for pain    fosinopril 20 mg oral tablet  -- 1 tab(s) by mouth once a day  -- Indication: For home med    FLUoxetine 40 mg oral capsule  -- 1 cap(s) by mouth once a day  -- Indication: For home med    atorvastatin 80 mg oral tablet  -- 1 tab(s) by mouth once a day  -- Indication: For home med    lisinopril-hydroCHLOROthiazide 20 mg-12.5 mg oral tablet  -- 1 tab(s) by mouth once a day  -- Indication: For home med    Ventolin HFA 90 mcg/inh inhalation aerosol  -- 2 puff(s) inhaled 4 times a day, As Needed  -- Indication: For home med    Colace 100 mg oral capsule  -- 1 cap(s) by mouth 2 times a day, As Needed -for constipation   -- Medication should be taken with plenty of water.    -- Indication: For for constipation    chromium picolinate 200 mcg oral tablet  -- 1 tab(s) by mouth once a day  -- Indication: For home med    oxybutynin 5 mg oral tablet  -- 1 tab(s) by mouth 3 times a day, As Needed stent pain  -- May cause drowsiness.  Alcohol may intensify this effect.  Use care when operating dangerous machinery.    -- Indication: For stent pain    Centrum Adults oral tablet  -- 1 tab(s) by mouth once a day  -- Indication: For home med    Vitamin D3 2000 intl units oral tablet  -- 1 tab(s) by mouth once a day  -- Indication: For home med    Vitamin B12 1000 mcg oral tablet  -- 1 tab(s) by mouth once a day  -- Indication: For home med

## 2018-06-06 NOTE — PROGRESS NOTE ADULT - SUBJECTIVE AND OBJECTIVE BOX
INTERVAL HPI/OVERNIGHT EVENTS:  No acute events overnight.    VITALS:    T(F): 99.1 (06-06-18 @ 04:30), Max: 99.1 (06-06-18 @ 04:30)  HR: 70 (06-06-18 @ 07:07) (64 - 92)  BP: 104/43 (06-06-18 @ 07:07) (85/43 - 181/111)  RR: 15 (06-06-18 @ 07:07) (12 - 45)  SpO2: 96% (06-06-18 @ 07:07) (95% - 100%)  Wt(kg): --    I&O's Detail    05 Jun 2018 07:01  -  06 Jun 2018 07:00  --------------------------------------------------------  IN:    lactated ringers.: 1875 mL    Oral Fluid: 960 mL  Total IN: 2835 mL    OUT:    Voided: 960 mL  Total OUT: 960 mL    Total NET: 1875 mL          MEDICATIONS:    ANTIBIOTICS:  ceFAZolin   IVPB 2000 milliGRAM(s) IV Intermittent every 8 hours      PAIN CONTROL:  acetaminophen   Tablet 650 milliGRAM(s) Oral every 6 hours PRN  acetaminophen   Tablet. 650 milliGRAM(s) Oral every 6 hours PRN  FLUoxetine 40 milliGRAM(s) Oral daily  HYDROmorphone  Injectable 0.5 milliGRAM(s) IV Push every 3 hours PRN  ondansetron Injectable 4 milliGRAM(s) IV Push every 6 hours PRN  oxyCODONE    5 mG/acetaminophen 325 mG 1 Tablet(s) Oral every 4 hours PRN  oxyCODONE    5 mG/acetaminophen 325 mG 2 Tablet(s) Oral every 6 hours PRN       MEDS:      HEME/ONC  heparin  Injectable 7500 Unit(s) SubCutaneous every 8 hours        PHYSICAL EXAM:  General: No acute distress.  Alert and Oriented  Abdominal Exam: Soft nontender, non distended   Exam:  BRP      LABS:                        12.1   9.4   )-----------( 232      ( 06 Jun 2018 04:41 )             36.8     06-06    136  |  99  |  22  ----------------------------<  121<H>  4.0   |  25  |  1.17    Ca    9.1      06 Jun 2018 04:41  Phos  4.4     06-06  Mg     1.9     06-06            RADIOLOGY & ADDITIONAL TESTS:    ASSESSMENT: 73yMale s/p cysto, URS, laser litho, Stent.     PLAN:  -Diet: Reg  -Pain control  -Monitor Urine Output  -DVT ppx: HSQ  -Cont Abx: Cefazolin  -OOB/IS  -Continue plan of care

## 2018-06-06 NOTE — DISCHARGE NOTE ADULT - CARE PROVIDER_API CALL
Valerio Leon), Urology  [address] [place] [address]  Phone: [phone]  Fax: [phone]

## 2018-06-06 NOTE — DISCHARGE NOTE ADULT - HOSPITAL COURSE
73M hx right ureteral stone underwent cysto, R urs, laser lithotripsy, R stent placement 6/5. Tolerated procedure well. VSS, afebrile and hemodynamically stable.

## 2018-06-08 LAB — COMPN STONE: SIGNIFICANT CHANGE UP

## 2018-07-05 NOTE — DISCHARGE NOTE ADULT - PATIENT PORTAL LINK FT
IMPROVE VTE Individual Risk Assessment    RISK                                                          Points  [] Previous VTE                                           3  [] Thrombophilia                                        2  [] Lower limb paralysis                              2   [] Current Cancer                                       2   [] Immobilization > 24 hrs                        1  [] ICU/CCU stay > 24 hours                       1  [] Age > 60                                                   1    IMPROVE VTE Score: 0
You can access the [facility] Patient Portal, offered by [facility], by registering with the following website: [email or web address]

## 2019-05-23 PROBLEM — N20.1 CALCULUS OF URETER: Chronic | Status: ACTIVE | Noted: 2018-06-04

## 2019-06-17 ENCOUNTER — APPOINTMENT (OUTPATIENT)
Dept: HEART AND VASCULAR | Facility: CLINIC | Age: 74
End: 2019-06-17

## 2020-09-17 ENCOUNTER — APPOINTMENT (OUTPATIENT)
Dept: UROLOGY | Facility: CLINIC | Age: 75
End: 2020-09-17
Payer: MEDICARE

## 2020-09-17 VITALS
SYSTOLIC BLOOD PRESSURE: 129 MMHG | TEMPERATURE: 98.2 F | HEART RATE: 76 BPM | DIASTOLIC BLOOD PRESSURE: 77 MMHG | HEIGHT: 65 IN

## 2020-09-17 DIAGNOSIS — G47.30 SLEEP APNEA, UNSPECIFIED: ICD-10-CM

## 2020-09-17 DIAGNOSIS — J44.9 CHRONIC OBSTRUCTIVE PULMONARY DISEASE, UNSPECIFIED: ICD-10-CM

## 2020-09-17 DIAGNOSIS — Z87.891 PERSONAL HISTORY OF NICOTINE DEPENDENCE: ICD-10-CM

## 2020-09-17 DIAGNOSIS — Z78.9 OTHER SPECIFIED HEALTH STATUS: ICD-10-CM

## 2020-09-17 DIAGNOSIS — E78.5 HYPERLIPIDEMIA, UNSPECIFIED: ICD-10-CM

## 2020-09-17 DIAGNOSIS — Z87.19 PERSONAL HISTORY OF OTHER DISEASES OF THE DIGESTIVE SYSTEM: ICD-10-CM

## 2020-09-17 DIAGNOSIS — M54.30 SCIATICA, UNSPECIFIED SIDE: ICD-10-CM

## 2020-09-17 DIAGNOSIS — F32.9 MAJOR DEPRESSIVE DISORDER, SINGLE EPISODE, UNSPECIFIED: ICD-10-CM

## 2020-09-17 DIAGNOSIS — M81.0 AGE-RELATED OSTEOPOROSIS W/OUT CURRENT PATHOLOGICAL FRACTURE: ICD-10-CM

## 2020-09-17 DIAGNOSIS — Z82.79 FAMILY HISTORY OF OTHER CONGENITAL MALFORMATIONS, DEFORMATIONS AND CHROMOSOMAL ABNORMALITIES: ICD-10-CM

## 2020-09-17 DIAGNOSIS — Z00.00 ENCOUNTER FOR GENERAL ADULT MEDICAL EXAMINATION W/OUT ABNORMAL FINDINGS: ICD-10-CM

## 2020-09-17 DIAGNOSIS — E11.9 TYPE 2 DIABETES MELLITUS W/OUT COMPLICATIONS: ICD-10-CM

## 2020-09-17 LAB
BILIRUB UR QL STRIP: NORMAL
CLARITY UR: CLEAR
COLLECTION METHOD: NORMAL
GLUCOSE UR-MCNC: NORMAL
HCG UR QL: NORMAL EU/DL
HGB UR QL STRIP.AUTO: NORMAL
KETONES UR-MCNC: NORMAL
NITRITE UR QL STRIP: NORMAL
PH UR STRIP: 6
PROT UR STRIP-MCNC: 0.2
SP GR UR STRIP: 1.02

## 2020-09-17 PROCEDURE — 51798 US URINE CAPACITY MEASURE: CPT

## 2020-09-17 PROCEDURE — 99215 OFFICE O/P EST HI 40 MIN: CPT | Mod: 25

## 2020-09-17 PROCEDURE — 81003 URINALYSIS AUTO W/O SCOPE: CPT | Mod: QW

## 2020-09-17 RX ORDER — HYDROCHLOROTHIAZIDE 12.5 MG/1
12.5 TABLET ORAL
Refills: 0 | Status: ACTIVE | COMMUNITY

## 2020-09-17 RX ORDER — FOSINOPRIL SODIUM 40 MG
TABLET ORAL
Refills: 0 | Status: ACTIVE | COMMUNITY

## 2020-09-17 RX ORDER — FLUOXETINE HYDROCHLORIDE 40 MG/1
CAPSULE ORAL
Refills: 0 | Status: ACTIVE | COMMUNITY

## 2020-09-17 RX ORDER — METFORMIN HYDROCHLORIDE 625 MG/1
TABLET ORAL
Refills: 0 | Status: ACTIVE | COMMUNITY

## 2020-09-17 RX ORDER — ATORVASTATIN CALCIUM 80 MG/1
80 TABLET, FILM COATED ORAL
Refills: 0 | Status: ACTIVE | COMMUNITY

## 2020-09-17 NOTE — HISTORY OF PRESENT ILLNESS
[FreeTextEntry1] : Mr. OTILIO SANTILLAN comes in today for his urologic follow-up.  He presents with negligible chronic lower urinary tract symptoms (predominantly frequency) with nocturia x1.\par IPSS: 1/35\par Sono: 56cc PVR\par \par Mr. Santillan has a history of urolithiasis identified during an evaluation for mild azotemia in 2018.\par In June 2018 he underwent a right ureteroscopy with lithotripsy for a 1.2x 1.0cm right proximal ureteral stone (100% Ca++oxalate). He has not had any recent episodes of renal colic.\par \par His erectile function is reported as normal.\par \par PSAs: 9/8/20--2.4;  11/5/19--2.5; 8/25/15--1.4; 12/3/14--1.3\par \par Renal sono:  9/16/20--Simple renal cysts.  No stones; 9/20/19--Bilat renal cysts. No stones.\par \par Creat: 7/25/18--1.12mg/dl; 1/25/18--1.5\par

## 2020-09-17 NOTE — PHYSICAL EXAM
[General Appearance - Well Developed] : well developed [General Appearance - Well Nourished] : well nourished [Normal Appearance] : normal appearance [Well Groomed] : well groomed [General Appearance - In No Acute Distress] : no acute distress [Abdomen Soft] : soft [Abdomen Tenderness] : non-tender [Abdomen Mass (___ Cm)] : no abdominal mass palpated [Abdomen Hernia] : no hernia was discovered [Costovertebral Angle Tenderness] : no ~M costovertebral angle tenderness [Penis Abnormality] : normal circumcised penis [Urinary Bladder Findings] : the bladder was normal on palpation [Scrotum] : the scrotum was normal [Epididymis] : the epididymides were normal [Testes Tenderness] : no tenderness of the testes [Testes Mass (___cm)] : there were no testicular masses [Prostate Tenderness] : the prostate was not tender [No Prostate Nodules] : no prostate nodules [Skin Color & Pigmentation] : normal skin color and pigmentation [Heart Rate And Rhythm] : Heart rate and rhythm were normal [Edema] : no peripheral edema [] : no respiratory distress [Respiration, Rhythm And Depth] : normal respiratory rhythm and effort [Exaggerated Use Of Accessory Muscles For Inspiration] : no accessory muscle use [Oriented To Time, Place, And Person] : oriented to person, place, and time [Affect] : the affect was normal [Mood] : the mood was normal [Not Anxious] : not anxious [Normal Station and Gait] : the gait and station were normal for the patient's age [No Focal Deficits] : no focal deficits [Sensation] : the sensory exam was normal to light touch and pinprick [No Palpable Adenopathy] : no palpable adenopathy [FreeTextEntry1] : Bilateral small hydroceles.  Glanular hypospadias.

## 2020-09-17 NOTE — LETTER BODY
[Dear  ___] : Dear  [unfilled], [Please see my note below.] : Please see my note below. [Consult Closing:] : Thank you very much for allowing me to participate in the care of this patient.  If you have any questions, please do not hesitate to contact me. [Sincerely,] : Sincerely, [FreeTextEntry3] : Valerio Leon MD, FACS\par

## 2020-09-17 NOTE — REVIEW OF SYSTEMS
[Eyesight Problems] : eyesight problems [Dry Eyes] : dryness of the eyes [Loss Of Hearing] : hearing loss [Shortness Of Breath] : shortness of breath [Joint Pain] : joint pain [Skin Lesions] : skin lesion [Difficulty Walking] : difficulty walking [Negative] : Heme/Lymph

## 2020-09-17 NOTE — ASSESSMENT
[FreeTextEntry1] : I discussed the findings and options with Mr. OTILIO SANTILLAN in detail.\par No additional intervention is warranted at this time.\par \par Providing the PSA remains stable. I would like to see Mr. Santillan in one year (bladder sono, PSA).\par We will repeat a renal sono in TWO years.

## 2020-12-21 NOTE — PATIENT PROFILE ADULT. - NS SC CAGE ALCOHOL ANNOYED YOU
Patient being transported out via [facility] stretcher in no sign of distress or discomfort with transport. no

## 2021-03-09 NOTE — DISCHARGE NOTE ADULT - CARE PLAN
09-Mar-2021 04:50 09-Mar-2021 02:44 09-Mar-2021 Principal Discharge DX:	Ureteral stone  Goal:	improvement after surgery  Assessment and plan of treatment:	regular diet, activity as tolerated. No heavy lifting or straining. [place] to leg bag, Stay well hydrated. If fever >100.4 or any change or worsening of symptoms please call doctor or report to ED. Make follow up appointment with Dr Leon for stent removal

## 2021-08-30 ENCOUNTER — APPOINTMENT (OUTPATIENT)
Dept: UROLOGY | Facility: CLINIC | Age: 76
End: 2021-08-30
Payer: MEDICARE

## 2021-08-30 VITALS
HEIGHT: 65 IN | BODY MASS INDEX: 49.98 KG/M2 | TEMPERATURE: 97.9 F | OXYGEN SATURATION: 95 % | DIASTOLIC BLOOD PRESSURE: 84 MMHG | WEIGHT: 300 LBS | HEART RATE: 95 BPM | SYSTOLIC BLOOD PRESSURE: 148 MMHG

## 2021-08-30 LAB
BILIRUB UR QL STRIP: NORMAL
CLARITY UR: CLEAR
COLLECTION METHOD: NORMAL
GLUCOSE UR-MCNC: NORMAL
HCG UR QL: 0.2 EU/DL
HGB UR QL STRIP.AUTO: NORMAL
KETONES UR-MCNC: NORMAL
LEUKOCYTE ESTERASE UR QL STRIP: NORMAL
NITRITE UR QL STRIP: NORMAL
PH UR STRIP: 5.5
PROT UR STRIP-MCNC: NORMAL
SP GR UR STRIP: 1.02

## 2021-08-30 PROCEDURE — 51798 US URINE CAPACITY MEASURE: CPT

## 2021-08-30 PROCEDURE — 99215 OFFICE O/P EST HI 40 MIN: CPT

## 2021-08-30 RX ORDER — BLOOD SUGAR DIAGNOSTIC
STRIP MISCELLANEOUS
Qty: 100 | Refills: 0 | Status: ACTIVE | COMMUNITY
Start: 2020-11-12

## 2021-08-30 RX ORDER — FOSINOPRIL SODIUM AND HYDROCHLOROTHIAZIDE 20; 12.5 MG/1; MG/1
20-12.5 TABLET ORAL
Qty: 90 | Refills: 0 | Status: ACTIVE | COMMUNITY
Start: 2021-07-01

## 2021-08-30 RX ORDER — ERYTHROMYCIN 5 MG/G
5 OINTMENT OPHTHALMIC
Qty: 4 | Refills: 0 | Status: ACTIVE | COMMUNITY
Start: 2021-06-25

## 2021-08-30 RX ORDER — DAPSONE 75 MG/G
7.5 GEL TOPICAL
Qty: 90 | Refills: 0 | Status: ACTIVE | COMMUNITY
Start: 2021-07-16

## 2021-08-30 RX ORDER — ALBUTEROL SULFATE 90 UG/1
108 (90 BASE) INHALANT RESPIRATORY (INHALATION)
Qty: 54 | Refills: 0 | Status: ACTIVE | COMMUNITY
Start: 2021-06-29

## 2021-08-30 RX ORDER — BENZOYL PEROXIDE 100 MG/ML
10 LIQUID TOPICAL
Qty: 142 | Refills: 0 | Status: ACTIVE | COMMUNITY
Start: 2021-08-17

## 2021-08-30 RX ORDER — METFORMIN ER 750 MG 750 MG/1
750 TABLET ORAL
Qty: 90 | Refills: 0 | Status: ACTIVE | COMMUNITY
Start: 2021-07-01

## 2021-08-30 RX ORDER — KETOCONAZOLE 20 MG/G
2 CREAM TOPICAL
Qty: 60 | Refills: 0 | Status: ACTIVE | COMMUNITY
Start: 2021-06-30

## 2021-08-30 RX ORDER — DICLOFENAC SODIUM 1% 10 MG/G
1 GEL TOPICAL
Qty: 100 | Refills: 0 | Status: ACTIVE | COMMUNITY
Start: 2021-04-12

## 2021-08-30 RX ORDER — CLINDAMYCIN PHOSPHATE 1 G/10ML
1 GEL TOPICAL
Qty: 60 | Refills: 0 | Status: ACTIVE | COMMUNITY
Start: 2021-08-17

## 2021-08-30 RX ORDER — FOSINOPRIL SODIUM 20 MG/1
20 TABLET ORAL
Qty: 90 | Refills: 0 | Status: ACTIVE | COMMUNITY
Start: 2021-06-25

## 2021-08-30 RX ORDER — METRONIDAZOLE 7.5 MG/G
0.75 CREAM TOPICAL
Qty: 45 | Refills: 0 | Status: ACTIVE | COMMUNITY
Start: 2021-06-30

## 2021-08-31 NOTE — ADDENDUM
[FreeTextEntry1] : A portion of this note was written by [Michael Ferrer] on 08/24/2021 acting as a scribe for Dr. Leon. \par \par I have personally reviewed the chart and agree that the record accurately reflects my personal performance of the history, physical exam, assessment, and plan.

## 2021-08-31 NOTE — PHYSICAL EXAM
[General Appearance - Well Developed] : well developed [General Appearance - Well Nourished] : well nourished [Well Groomed] : well groomed [Normal Appearance] : normal appearance [General Appearance - In No Acute Distress] : no acute distress [Abdomen Soft] : soft [Abdomen Mass (___ Cm)] : no abdominal mass palpated [Abdomen Tenderness] : non-tender [Abdomen Hernia] : no hernia was discovered [Costovertebral Angle Tenderness] : no ~M costovertebral angle tenderness [Penis Abnormality] : normal circumcised penis [Urinary Bladder Findings] : the bladder was normal on palpation [Epididymis] : the epididymides were normal [Scrotum] : the scrotum was normal [Testes Tenderness] : no tenderness of the testes [Prostate Tenderness] : the prostate was not tender [Testes Mass (___cm)] : there were no testicular masses [No Prostate Nodules] : no prostate nodules [Skin Color & Pigmentation] : normal skin color and pigmentation [Heart Rate And Rhythm] : Heart rate and rhythm were normal [] : no respiratory distress [Respiration, Rhythm And Depth] : normal respiratory rhythm and effort [Exaggerated Use Of Accessory Muscles For Inspiration] : no accessory muscle use [Oriented To Time, Place, And Person] : oriented to person, place, and time [Affect] : the affect was normal [Mood] : the mood was normal [Not Anxious] : not anxious [Normal Station and Gait] : the gait and station were normal for the patient's age [No Focal Deficits] : no focal deficits [Sensation] : the sensory exam was normal to light touch and pinprick [No Palpable Adenopathy] : no palpable adenopathy [FreeTextEntry1] : Minimal lower extremity edema

## 2021-08-31 NOTE — ASSESSMENT
[FreeTextEntry1] : I discussed the findings and options with Mr. OTILIO SANTILLAN in detail.\par No additional intervention is warranted at this time.\par \par A PSA will be drawn by Dr. Tye San next month. Providing the PSA remains stable, I would like to see Mr. Santillan in one year (bladder sono, PSA).\par \par We will repeat a renal sono in TWO years.

## 2021-08-31 NOTE — HISTORY OF PRESENT ILLNESS
[FreeTextEntry1] : Mr. OTILIO SANTILLAN comes in today for his urologic follow-up.  He presents with negligible chronic lower urinary tract symptoms (predominantly frequency) with nocturia x 1.\par IPSS: 1/35\par Sono: 1cc PVR\par \par Mr. Santillan has a history of urolithiasis identified during an evaluation for mild azotemia in 2018.\par In June 2018 he underwent a right ureteroscopy with lithotripsy for a 1.2 x 1.0cm right proximal ureteral stone (100% Ca++oxalate).  He has remained asymptomatic since.\par \par His erectile function is reported as normal.\par \par PSAs: 9/8/20--2.4;  11/5/19--2.5; 8/25/15--1.4; 12/3/14--1.3\par \par Renal sono:  9/16/20--Simple renal cysts.  No stones; 9/20/19--Bilat renal cysts. No stones.\par \par Creat: 7/25/18--1.12mg/dl; 1/25/18--1.5\par

## 2021-10-12 ENCOUNTER — NON-APPOINTMENT (OUTPATIENT)
Age: 76
End: 2021-10-12

## 2022-08-29 ENCOUNTER — APPOINTMENT (OUTPATIENT)
Dept: UROLOGY | Facility: CLINIC | Age: 77
End: 2022-08-29

## 2022-08-29 VITALS
TEMPERATURE: 97.5 F | HEIGHT: 65 IN | BODY MASS INDEX: 44.98 KG/M2 | HEART RATE: 85 BPM | SYSTOLIC BLOOD PRESSURE: 118 MMHG | WEIGHT: 270 LBS | DIASTOLIC BLOOD PRESSURE: 70 MMHG

## 2022-08-29 DIAGNOSIS — E66.01 MORBID (SEVERE) OBESITY DUE TO EXCESS CALORIES: ICD-10-CM

## 2022-08-29 LAB
BILIRUB UR QL STRIP: NORMAL
CLARITY UR: CLEAR
COLLECTION METHOD: NORMAL
GLUCOSE UR-MCNC: NORMAL
HCG UR QL: 0.2 EU/DL
HGB UR QL STRIP.AUTO: NORMAL
KETONES UR-MCNC: NORMAL
LEUKOCYTE ESTERASE UR QL STRIP: NORMAL
NITRITE UR QL STRIP: NORMAL
PH UR STRIP: 5
PROT UR STRIP-MCNC: NORMAL
SP GR UR STRIP: 1.02

## 2022-08-29 PROCEDURE — 99215 OFFICE O/P EST HI 40 MIN: CPT

## 2022-08-29 PROCEDURE — 76857 US EXAM PELVIC LIMITED: CPT

## 2022-08-29 NOTE — PHYSICAL EXAM
[General Appearance - Well Developed] : well developed [General Appearance - Well Nourished] : well nourished [Normal Appearance] : normal appearance [Well Groomed] : well groomed [General Appearance - In No Acute Distress] : no acute distress [Abdomen Soft] : soft [Abdomen Tenderness] : non-tender [Abdomen Mass (___ Cm)] : no abdominal mass palpated [Abdomen Hernia] : no hernia was discovered [Costovertebral Angle Tenderness] : no ~M costovertebral angle tenderness [Penis Abnormality] : normal circumcised penis [Urinary Bladder Findings] : the bladder was normal on palpation [Scrotum] : the scrotum was normal [Epididymis] : the epididymides were normal [Testes Tenderness] : no tenderness of the testes [Testes Mass (___cm)] : there were no testicular masses [Prostate Tenderness] : the prostate was not tender [No Prostate Nodules] : no prostate nodules [Skin Color & Pigmentation] : normal skin color and pigmentation [Heart Rate And Rhythm] : Heart rate and rhythm were normal [] : no respiratory distress [Respiration, Rhythm And Depth] : normal respiratory rhythm and effort [Exaggerated Use Of Accessory Muscles For Inspiration] : no accessory muscle use [Oriented To Time, Place, And Person] : oriented to person, place, and time [Affect] : the affect was normal [Mood] : the mood was normal [Not Anxious] : not anxious [Normal Station and Gait] : the gait and station were normal for the patient's age [No Focal Deficits] : no focal deficits [Sensation] : the sensory exam was normal to light touch and pinprick [No Palpable Adenopathy] : no palpable adenopathy [FreeTextEntry1] : Moderate lower extremity edema

## 2022-08-29 NOTE — HISTORY OF PRESENT ILLNESS
[FreeTextEntry1] : Mr. OTILIO SANTILLAN comes in today for his annual urologic follow-up.  He presents with negligible chronic lower urinary tract symptoms (predominantly frequency) with nocturia x 0-1.\par IPSS: 0/35\par Sono (performed to assess bladder emptying): 57cc PVR; 31cc prostate\par \par Mr. Santillan has a history of urolithiasis identified during an evaluation for mild azotemia in 2018.\par In June 2018 he underwent a right ureteroscopy with lithotripsy for a 1.2 x 1.0cm right proximal ureteral stone (100% Ca++oxalate).  He has remained asymptomatic since.\par \par His erectile function is reported as normal.\par \par PSAs: 9/14/21--1.84; 9/8/20--2.4;  11/5/19--2.5; 8/25/15--1.4; 12/3/14--1.3\par \par Renal sono: 8/16/22--Benign simple renal cysts, largest 8.7cm at the left midpole. No stones. No hydronephrosis; 9/16/20--Simple renal cysts.  No stones; 9/20/19--Bilat renal cysts. No stones; \par \par Creat: 7/25/18--1.12mg/dl; 1/25/18--1.5; \par

## 2022-08-29 NOTE — ADDENDUM
[FreeTextEntry1] : A portion of this note was written by [Michael Ferrer] on 08/25/2022 acting as a scribe for Dr. Leon. \par \par I have personally reviewed the chart and agree that the record accurately reflects my personal performance of the history, physical exam, assessment, and plan.

## 2022-08-29 NOTE — ASSESSMENT
[FreeTextEntry1] : I discussed the findings and options with . OTILIO SANTILLAN in detail. Urologically he is doing very well and no further intervention is warranted at this time.\par \par A PSA will be drawn by Dr. Tye San next week and I have asked Mr. Santillan to have this sent to us. Providing the PSA remains stable, I look forward to seeing him in 1 year (bladder sono, renal sono, PSA).\par \par

## 2022-11-08 ENCOUNTER — TRANSCRIPTION ENCOUNTER (OUTPATIENT)
Age: 77
End: 2022-11-08

## 2023-08-28 ENCOUNTER — RESULT CHARGE (OUTPATIENT)
Age: 78
End: 2023-08-28

## 2023-08-28 ENCOUNTER — APPOINTMENT (OUTPATIENT)
Dept: UROLOGY | Facility: CLINIC | Age: 78
End: 2023-08-28
Payer: MEDICARE

## 2023-08-28 VITALS
TEMPERATURE: 97 F | OXYGEN SATURATION: 96 % | HEART RATE: 88 BPM | WEIGHT: 275 LBS | SYSTOLIC BLOOD PRESSURE: 111 MMHG | DIASTOLIC BLOOD PRESSURE: 72 MMHG | HEIGHT: 65 IN | BODY MASS INDEX: 45.82 KG/M2

## 2023-08-28 DIAGNOSIS — N20.9 URINARY CALCULUS, UNSPECIFIED: ICD-10-CM

## 2023-08-28 DIAGNOSIS — R35.1 BENIGN PROSTATIC HYPERPLASIA WITH LOWER URINARY TRACT SYMPMS: ICD-10-CM

## 2023-08-28 DIAGNOSIS — R39.9 UNSPECIFIED SYMPTOMS AND SIGNS INVOLVING THE GENITOURINARY SYSTEM: ICD-10-CM

## 2023-08-28 DIAGNOSIS — Q54.0 HYPOSPADIAS, BALANIC: ICD-10-CM

## 2023-08-28 DIAGNOSIS — N43.3 HYDROCELE, UNSPECIFIED: ICD-10-CM

## 2023-08-28 DIAGNOSIS — N40.1 BENIGN PROSTATIC HYPERPLASIA WITH LOWER URINARY TRACT SYMPMS: ICD-10-CM

## 2023-08-28 PROCEDURE — 99214 OFFICE O/P EST MOD 30 MIN: CPT

## 2023-08-28 PROCEDURE — 76857 US EXAM PELVIC LIMITED: CPT

## 2023-08-28 NOTE — PHYSICAL EXAM
[General Appearance - Well Developed] : well developed [General Appearance - Well Nourished] : well nourished [Normal Appearance] : normal appearance [Well Groomed] : well groomed [General Appearance - In No Acute Distress] : no acute distress [Abdomen Tenderness] : non-tender [Abdomen Soft] : soft [Abdomen Mass (___ Cm)] : no abdominal mass palpated [Abdomen Hernia] : no hernia was discovered [Costovertebral Angle Tenderness] : no ~M costovertebral angle tenderness [Penis Abnormality] : normal circumcised penis [Urinary Bladder Findings] : the bladder was normal on palpation [Scrotum] : the scrotum was normal [Epididymis] : the epididymides were normal [Testes Tenderness] : no tenderness of the testes [Prostate Tenderness] : the prostate was not tender [Testes Mass (___cm)] : there were no testicular masses [No Prostate Nodules] : no prostate nodules [Skin Color & Pigmentation] : normal skin color and pigmentation [Heart Rate And Rhythm] : Heart rate and rhythm were normal [] : no respiratory distress [Respiration, Rhythm And Depth] : normal respiratory rhythm and effort [Exaggerated Use Of Accessory Muscles For Inspiration] : no accessory muscle use [Affect] : the affect was normal [Oriented To Time, Place, And Person] : oriented to person, place, and time [Mood] : the mood was normal [Not Anxious] : not anxious [Normal Station and Gait] : the gait and station were normal for the patient's age [No Focal Deficits] : no focal deficits [Sensation] : the sensory exam was normal to light touch and pinprick [No Palpable Adenopathy] : no palpable adenopathy [Inguinal Lymph Nodes Enlarged Bilaterally] : inguinal [FreeTextEntry1] : Moderate lower extremity edema

## 2023-08-28 NOTE — ASSESSMENT
[FreeTextEntry1] : I discussed the findings and options with . OTILIO SANTILLAN in detail. He is doing very well urologically and no intervention is required for his negligible urinary tract symptoms. His renal sonogram identified a potential kidney stone, but we will forgo additional imaging since he remains asymptomatic at this time.  A PSA will be drawn by his internist in October and I have asked Mr. Santillan to have this sent to us. Providing this is stable and there are no new problems, I look forward to seeing Mr. Santillan in 1 year (sono PVR, renal sono, PSA).

## 2023-08-28 NOTE — REVIEW OF SYSTEMS
[Eyesight Problems] : eyesight problems [Dry Eyes] : dryness of the eyes [Loss Of Hearing] : hearing loss [Joint Pain] : joint pain [Shortness Of Breath] : shortness of breath [Skin Lesions] : skin lesion [Difficulty Walking] : difficulty walking [Negative] : Heme/Lymph

## 2023-08-28 NOTE — HISTORY OF PRESENT ILLNESS
[FreeTextEntry1] : Mr. OTILIO SANTILLAN comes in today for his annual urologic follow-up.  He presents with negligible chronic lower urinary tract symptoms (predominantly frequency) with nocturia x 0-1. IPSS: 0/0 Sono (performed to assess bladder emptying): PVR 45 cc, 31 cc prostate  Mr. Santillan has a history of urolithiasis identified during an evaluation for mild azotemia in 2018. In June 2018 he underwent a right ureteroscopy with lithotripsy for a 1.2 x 1.0cm right proximal ureteral stone (100% Ca++oxalate).  Since then, he has remained asymptomatic.  He reports normal erections.  PSAs: 10/4/22--1.88; 9/14/21--1.84; 9/8/20--2.4;  11/5/19--2.5; 8/25/15--1.4; 12/3/14--1.3  Renal sono: 8/28/23--5.6 mm possible left lower pole stone; 3.6 cm right midpole simple cyst, 8.7 cm left midpole cyst, 2.4 cm left lower pole cyst; 8/16/22--Benign simple renal cysts, largest 8.7cm at the left midpole. No stones. No hydronephrosis; 9/16/20--Simple renal cysts.  No stones; 9/20/19--Bilat renal cysts. No stones;   Creat: 7/25/18--1.12mg/dl; 1/25/18--1.5;

## 2024-08-26 ENCOUNTER — APPOINTMENT (OUTPATIENT)
Dept: UROLOGY | Facility: CLINIC | Age: 79
End: 2024-08-26
Payer: MEDICARE

## 2024-08-26 DIAGNOSIS — R35.1 BENIGN PROSTATIC HYPERPLASIA WITH LOWER URINARY TRACT SYMPMS: ICD-10-CM

## 2024-08-26 DIAGNOSIS — N40.1 BENIGN PROSTATIC HYPERPLASIA WITH LOWER URINARY TRACT SYMPMS: ICD-10-CM

## 2024-08-26 DIAGNOSIS — Q54.0 HYPOSPADIAS, BALANIC: ICD-10-CM

## 2024-08-26 DIAGNOSIS — N20.9 URINARY CALCULUS, UNSPECIFIED: ICD-10-CM

## 2024-08-26 DIAGNOSIS — R39.9 UNSPECIFIED SYMPTOMS AND SIGNS INVOLVING THE GENITOURINARY SYSTEM: ICD-10-CM

## 2024-08-26 DIAGNOSIS — N43.3 HYDROCELE, UNSPECIFIED: ICD-10-CM

## 2024-08-26 PROCEDURE — 76775 US EXAM ABDO BACK WALL LIM: CPT

## 2024-08-26 PROCEDURE — 51798 US URINE CAPACITY MEASURE: CPT

## 2024-08-26 PROCEDURE — 99214 OFFICE O/P EST MOD 30 MIN: CPT

## 2024-08-26 NOTE — PHYSICAL EXAM
[General Appearance - Well Developed] : well developed [General Appearance - Well Nourished] : well nourished [Normal Appearance] : normal appearance [Well Groomed] : well groomed [General Appearance - In No Acute Distress] : no acute distress [Abdomen Soft] : soft [Abdomen Tenderness] : non-tender [Abdomen Hernia] : no hernia was discovered [Abdomen Mass (___ Cm)] : no abdominal mass palpated [Costovertebral Angle Tenderness] : no ~M costovertebral angle tenderness [Penis Abnormality] : normal circumcised penis [Urinary Bladder Findings] : the bladder was normal on palpation [Scrotum] : the scrotum was normal [Epididymis] : the epididymides were normal [Testes Tenderness] : no tenderness of the testes [Testes Mass (___cm)] : there were no testicular masses [Prostate Tenderness] : the prostate was not tender [No Prostate Nodules] : no prostate nodules [Skin Color & Pigmentation] : normal skin color and pigmentation [Heart Rate And Rhythm] : Heart rate and rhythm were normal [Respiration, Rhythm And Depth] : normal respiratory rhythm and effort [] : no respiratory distress [Exaggerated Use Of Accessory Muscles For Inspiration] : no accessory muscle use [Oriented To Time, Place, And Person] : oriented to person, place, and time [Affect] : the affect was normal [Mood] : the mood was normal [Not Anxious] : not anxious [Normal Station and Gait] : the gait and station were normal for the patient's age [No Focal Deficits] : no focal deficits [Sensation] : the sensory exam was normal to light touch and pinprick [No Palpable Adenopathy] : no palpable adenopathy [Inguinal Lymph Nodes Enlarged Bilaterally] : inguinal [FreeTextEntry1] : Moderate lower extremity edema

## 2024-08-26 NOTE — HISTORY OF PRESENT ILLNESS
[FreeTextEntry1] : Mr. OTILIO SANTILLAN returns for his annual urologic follow-up.  He presents with negligible chronic lower urinary tract symptoms. IPSS: 0/0 Sono (performed to assess bladder emptying): 45cc PVR  Mr. Santillan has a history of urolithiasis identified during an evaluation for mild azotemia in 2018. In June 2018 he underwent a right ureteroscopy with lithotripsy for a 1.2 x 1.0cm right proximal ureteral stone (100% Ca++oxalate).  Since then, he has remained asymptomatic.  He reports normal erections.  PSAs: 10/13/23--2.4; 10/4/22--1.88; 9/14/21--1.84; 9/8/20--2.4; 11/5/19--2.5; 8/25/15--1.4; 12/3/14--1.3  Renal sono: 8/26/24-- 3.7 cm right midpole simple renal cyst.  6 mm left lower pole stone.  8.8 cm left lateral midpole cyst; 8/28/23--5.6 mm possible left lower pole stone; 3.6 cm right midpole simple cyst, 8.7 cm left midpole cyst, 2.4 cm left lower pole cyst; 8/16/22--Benign simple renal cysts, largest 8.7cm at the left midpole. No stones. No hydronephrosis; 9/16/20--Simple renal cysts.  No stones; 9/20/19--Bilat renal cysts. No stones;   Creat: 7/25/18--1.12mg/dl; 1/25/18--1.5;

## 2024-08-26 NOTE — ASSESSMENT
[FreeTextEntry1] : I discussed the findings and options with Mr. OTILIO SANTILLAN in detail. He is doing very well urologically and no intervention is required for his negligible urinary tract symptoms.  The renal sonogram again identified the left lower pole stone which has remained asymptomatic and does not require any intervention.    A PSA will be drawn by his internist in October and I have asked Mr. Santillan to have the result sent to us. Providing this is stable and there are no new problems, he will followup in 1 year (sono PVR, renal sono, PSA).

## 2024-08-26 NOTE — PHYSICAL EXAM
[General Appearance - Well Developed] : well developed [General Appearance - Well Nourished] : well nourished [Normal Appearance] : normal appearance [Well Groomed] : well groomed [General Appearance - In No Acute Distress] : no acute distress [Abdomen Soft] : soft [Abdomen Tenderness] : non-tender [Abdomen Mass (___ Cm)] : no abdominal mass palpated [Abdomen Hernia] : no hernia was discovered [Costovertebral Angle Tenderness] : no ~M costovertebral angle tenderness [Penis Abnormality] : normal circumcised penis [Urinary Bladder Findings] : the bladder was normal on palpation [Scrotum] : the scrotum was normal [Epididymis] : the epididymides were normal [Testes Tenderness] : no tenderness of the testes [Testes Mass (___cm)] : there were no testicular masses [Prostate Tenderness] : the prostate was not tender [No Prostate Nodules] : no prostate nodules [Skin Color & Pigmentation] : normal skin color and pigmentation [Heart Rate And Rhythm] : Heart rate and rhythm were normal [] : no respiratory distress [Respiration, Rhythm And Depth] : normal respiratory rhythm and effort [Exaggerated Use Of Accessory Muscles For Inspiration] : no accessory muscle use [Oriented To Time, Place, And Person] : oriented to person, place, and time [Affect] : the affect was normal [Mood] : the mood was normal [Not Anxious] : not anxious [Normal Station and Gait] : the gait and station were normal for the patient's age [No Focal Deficits] : no focal deficits [Sensation] : the sensory exam was normal to light touch and pinprick [No Palpable Adenopathy] : no palpable adenopathy [Inguinal Lymph Nodes Enlarged Bilaterally] : inguinal [FreeTextEntry1] : Moderate lower extremity edema

## 2025-09-02 ENCOUNTER — APPOINTMENT (OUTPATIENT)
Dept: UROLOGY | Facility: CLINIC | Age: 80
End: 2025-09-02
Payer: MEDICARE

## 2025-09-02 ENCOUNTER — NON-APPOINTMENT (OUTPATIENT)
Age: 80
End: 2025-09-02

## 2025-09-02 VITALS
HEART RATE: 87 BPM | HEIGHT: 65 IN | WEIGHT: 275 LBS | TEMPERATURE: 97.7 F | SYSTOLIC BLOOD PRESSURE: 119 MMHG | BODY MASS INDEX: 45.82 KG/M2 | DIASTOLIC BLOOD PRESSURE: 74 MMHG

## 2025-09-02 DIAGNOSIS — R35.1 BENIGN PROSTATIC HYPERPLASIA WITH LOWER URINARY TRACT SYMPMS: ICD-10-CM

## 2025-09-02 DIAGNOSIS — N20.9 URINARY CALCULUS, UNSPECIFIED: ICD-10-CM

## 2025-09-02 DIAGNOSIS — N40.1 BENIGN PROSTATIC HYPERPLASIA WITH LOWER URINARY TRACT SYMPMS: ICD-10-CM

## 2025-09-02 DIAGNOSIS — R39.9 UNSPECIFIED SYMPTOMS AND SIGNS INVOLVING THE GENITOURINARY SYSTEM: ICD-10-CM

## 2025-09-02 PROCEDURE — 76705 ECHO EXAM OF ABDOMEN: CPT

## 2025-09-02 PROCEDURE — G2211 COMPLEX E/M VISIT ADD ON: CPT

## 2025-09-02 PROCEDURE — 99214 OFFICE O/P EST MOD 30 MIN: CPT
